# Patient Record
Sex: MALE | Race: WHITE | Employment: OTHER | URBAN - METROPOLITAN AREA
[De-identification: names, ages, dates, MRNs, and addresses within clinical notes are randomized per-mention and may not be internally consistent; named-entity substitution may affect disease eponyms.]

---

## 2019-10-26 ENCOUNTER — APPOINTMENT (OUTPATIENT)
Dept: GENERAL RADIOLOGY | Age: 49
End: 2019-10-26
Attending: NURSE PRACTITIONER
Payer: COMMERCIAL

## 2019-10-26 ENCOUNTER — HOSPITAL ENCOUNTER (EMERGENCY)
Age: 49
Discharge: HOME | End: 2019-10-26
Attending: EMERGENCY MEDICINE
Payer: COMMERCIAL

## 2019-10-26 VITALS
WEIGHT: 205.69 LBS | OXYGEN SATURATION: 98 % | BODY MASS INDEX: 27.26 KG/M2 | TEMPERATURE: 98.3 F | HEIGHT: 73 IN | DIASTOLIC BLOOD PRESSURE: 83 MMHG | HEART RATE: 62 BPM | RESPIRATION RATE: 16 BRPM | SYSTOLIC BLOOD PRESSURE: 128 MMHG
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
## 2019-10-26 DIAGNOSIS — S69.92XA FINGERNAIL INJURY, LEFT, INITIAL ENCOUNTER: Primary | ICD-10-CM

## 2019-10-26 PROCEDURE — 74011250636 HC RX REV CODE- 250/636: Performed by: NURSE PRACTITIONER

## 2019-10-26 PROCEDURE — 77030008304 HC SPLNT FNGR ALUM DERY -A

## 2019-10-26 PROCEDURE — 96372 THER/PROPH/DIAG INJ SC/IM: CPT

## 2019-10-26 PROCEDURE — 77030018836 HC SOL IRR NACL ICUM -A

## 2019-10-26 PROCEDURE — 99282 EMERGENCY DEPT VISIT SF MDM: CPT

## 2019-10-26 PROCEDURE — 90471 IMMUNIZATION ADMIN: CPT

## 2019-10-26 PROCEDURE — 75810000283 HC INJECTION NERVE BLOCK

## 2019-10-26 PROCEDURE — 73140 X-RAY EXAM OF FINGER(S): CPT

## 2019-10-26 PROCEDURE — 90715 TDAP VACCINE 7 YRS/> IM: CPT | Performed by: NURSE PRACTITIONER

## 2019-10-26 PROCEDURE — 74011000250 HC RX REV CODE- 250: Performed by: NURSE PRACTITIONER

## 2019-10-26 RX ORDER — HYDROCODONE BITARTRATE AND ACETAMINOPHEN 5; 325 MG/1; MG/1
1 TABLET ORAL
Qty: 18 TAB | Refills: 0 | Status: SHIPPED | OUTPATIENT
Start: 2019-10-26 | End: 2019-10-29

## 2019-10-26 RX ORDER — LIDOCAINE HYDROCHLORIDE 10 MG/ML
10 INJECTION, SOLUTION EPIDURAL; INFILTRATION; INTRACAUDAL; PERINEURAL ONCE
Status: COMPLETED | OUTPATIENT
Start: 2019-10-26 | End: 2019-10-26

## 2019-10-26 RX ORDER — CEPHALEXIN 500 MG/1
500 CAPSULE ORAL 4 TIMES DAILY
Qty: 28 CAP | Refills: 0 | Status: SHIPPED | OUTPATIENT
Start: 2019-10-26 | End: 2019-11-02

## 2019-10-26 RX ADMIN — LIDOCAINE HYDROCHLORIDE 10 ML: 10 INJECTION, SOLUTION EPIDURAL; INFILTRATION; INTRACAUDAL; PERINEURAL at 16:35

## 2019-10-26 RX ADMIN — TETANUS TOXOID, REDUCED DIPHTHERIA TOXOID AND ACELLULAR PERTUSSIS VACCINE, ADSORBED 0.5 ML: 5; 2.5; 8; 8; 2.5 SUSPENSION INTRAMUSCULAR at 16:23

## 2019-10-26 RX ADMIN — WATER 1000 MG: 1 INJECTION INTRAMUSCULAR; INTRAVENOUS; SUBCUTANEOUS at 16:35

## 2019-10-26 NOTE — ED PROVIDER NOTES
Patient is a 51-year-old male without significant past medical history who was ambulatory to the ED today with family members following a woodworking incident. Patient states he was working on a router when he cut the top of the left fourth finger off including the nailbed. Patient is unsure of his last tetanus. The router removed the dorsal surface of the finger nail bed area. Patient is left-hand dominant. Patient has no further complaints at this time. Primary care provider:Nohemi Mulligan MD    The history is provided by the patient. No  was used. Past Medical History:   Diagnosis Date    History of seasonal allergies      History reviewed. No pertinent surgical history. History reviewed. No pertinent family history.     Social History     Socioeconomic History    Marital status:      Spouse name: Not on file    Number of children: Not on file    Years of education: Not on file    Highest education level: Not on file   Occupational History    Not on file   Social Needs    Financial resource strain: Not on file    Food insecurity:     Worry: Not on file     Inability: Not on file    Transportation needs:     Medical: Not on file     Non-medical: Not on file   Tobacco Use    Smoking status: Never Smoker    Smokeless tobacco: Never Used   Substance and Sexual Activity    Alcohol use: Yes     Comment: social    Drug use: No    Sexual activity: Yes     Partners: Female   Lifestyle    Physical activity:     Days per week: Not on file     Minutes per session: Not on file    Stress: Not on file   Relationships    Social connections:     Talks on phone: Not on file     Gets together: Not on file     Attends [employer] service: Not on file     Active member of club or organization: Not on file     Attends meetings of clubs or organizations: Not on file     Relationship status: Not on file    Intimate partner violence:     Fear of current or ex partner: Not on file     Emotionally abused: Not on file     Physically abused: Not on file     Forced sexual activity: Not on file   Other Topics Concern    Not on file   Social History Narrative    Not on file     ALLERGIES: Penicillins    Review of Systems   Skin: Positive for wound. All other systems reviewed and are negative. Vitals:    10/26/19 1607   BP: 128/83   Pulse: 62   Resp: 16   Temp: 98.3 °F (36.8 °C)   SpO2: 98%   Weight: 93.3 kg (205 lb 11 oz)   Height: 6' 1\" (1.854 m)          Physical Exam   Constitutional: Vital signs are normal. He appears well-developed and well-nourished. He is active and cooperative. Non-toxic appearance. He does not have a sickly appearance. He does not appear ill. No distress. 51-year-old male in no apparent distress. HENT:   Head: Atraumatic. Eyes: EOM are normal.   Neck: No tracheal deviation present. Pulmonary/Chest: Effort normal. No respiratory distress. Musculoskeletal: Normal range of motion. Left hand: He exhibits tenderness, deformity, laceration and swelling. Decreased sensation noted. Hands:  Left fourth finger: Injury to the nail and nailbed. Pain with range of motion. Hemostatic wound. Unsure if nail matrix is intact on the bed nail matrix at base of nail appears to be intact however nailbed is mangled. Neurological: He is alert. Skin: Skin is warm and dry. Psychiatric: He has a normal mood and affect. His behavior is normal. Judgment and thought content normal.   Nursing note and vitals reviewed. Pre wound irrigation:     Post irrigation:        MDM     Nerve Block  Date/Time: 10/26/2019 4:45 PM  Performed by: Eric Harvey NP  Authorized by: Eric Harvey NP     Consent:     Consent obtained:  Verbal    Consent given by:  Patient  Indications:     Indications:  Pain relief and procedural anesthesia  Location:     Body area:  Upper extremity    Upper extremity nerve blocked: Finger block.     Laterality: Left  Pre-procedure details:     Skin preparation:  Alcohol  Skin anesthesia (see MAR for exact dosages):     Skin anesthesia method:  Local infiltration    Local anesthetic:  Lidocaine 1% w/o epi  Procedure details (see MAR for exact dosages): Block needle gauge:  21 G    Injection procedure:  Anatomic landmarks identified, incremental injection and negative aspiration for blood  Post-procedure details:     Outcome:  Anesthesia achieved    Patient tolerance of procedure: Tolerated well, no immediate complications  Comments:      Nerve block for wound care to finger wound. Assessment & Plan:     Orders Placed This Encounter    XR 4TH FINGER LEFT    Wound Irrigation    diph,Pertuss(AC),Tet Vac-PF (BOOSTRIX) suspension 0.5 mL    ceFAZolin (ANCEF) 1,000 mg in sterile water (preservative free) injection    lidocaine (XYLOCAINE) 10 mg/mL (1 %) injection 10 mL       Discussed with Keke Colindres MD,ED Provider    Nolan Ivy NP  10/26/19  4:12 PM      No open fracture. Wound cleansed after block of the finger. Nail matrix appears intact. Unsure of nailbed. Wound is hemostatic. Cover with Keflex 4 times a day for 7 days. Norco for pain. Follow-up with hand surgery on Monday. Keep dressing intact until then. Discussed return precautions. 5:22 PM  Patient re-evaluated. All questions answered. Patient appropriate for discharge. Given return precautions and follow up instructions. LABORATORY TESTS:  Labs Reviewed - No data to display    IMAGING RESULTS:  XR 4TH FINGER LT MIN 2 V   Final Result   IMPRESSION: No evidence of fracture or foreign bodies.           MEDICATIONS GIVEN:  Medications   diph,Pertuss(AC),Tet Vac-PF (BOOSTRIX) suspension 0.5 mL (0.5 mL IntraMUSCular Given 10/26/19 1623)   ceFAZolin (ANCEF) 1,000 mg in sterile water (preservative free) injection (1,000 mg IntraMUSCular Given 10/26/19 1635)   lidocaine (PF) (XYLOCAINE) 10 mg/mL (1 %) injection 10 mL (10 mL IntraDERMal Given by Provider 10/26/19 6313)       IMPRESSION:  1. Fingernail injury, left, initial encounter    2. Contact w powered woodworking and forming machines, init        PLAN:  1. Current Discharge Medication List      START taking these medications    Details   cephALEXin (KEFLEX) 500 mg capsule Take 1 Cap by mouth four (4) times daily for 7 days. Qty: 28 Cap, Refills: 0         CONTINUE these medications which have CHANGED    Details   HYDROcodone-acetaminophen (NORCO) 5-325 mg per tablet Take 1 Tab by mouth every four (4) hours as needed for Pain for up to 3 days. Max Daily Amount: 6 Tabs. Qty: 18 Tab, Refills: 0    Associated Diagnoses: Fingernail injury, left, initial encounter         STOP taking these medications       mometasone (NASONEX) 50 mcg/actuation nasal spray Comments:   Reason for Stoppin.   Follow-up Information     Follow up With Specialties Details Why Contact Info    [facility]  Schedule an appointment as soon as possible for a visit in 2 days Call Monday for same day appointment for one of the hand surgeons (Nancy Vo) [address] [place] [address]    Perla Gamboa MD Family Practice Schedule an appointment as soon as possible for a visit As needed [address] [facility] [id number] [phone]      [address] [facility] DEPT Emergency Medicine  As needed, If symptoms worsen [address]  [phone]        3. Return to ED for new or worsening symptoms       Agustin Beasley NP                    Please note that this dictation was completed with [employer], the computer voice recognition software. Quite often unanticipated grammatical, syntax, homophones, and other interpretive errors are inadvertently transcribed by the computer software. Please disregard these errors. Please excuse any errors that have escaped final proofreading.

## 2019-10-26 NOTE — ED NOTES
Patient  discharged with instructions per NP [facility]. Instructions reviewed with pt.

## 2019-10-26 NOTE — DISCHARGE INSTRUCTIONS
Thank you for allowing us to care for you today. Please follow-up with your Primary Care provider in the next 2-3 days if your symptoms do not improve. Plan for home:     Keep your finger in a splint and dressed. Keep the splint clean and dry. Do not remove the splint and dressing. You may follow-up with any preferred orthopedic HAND surgeon or one you have had a previous relationship with. Each has multiple locations. Chose a location that works best for you and your injury. You should use ice or heat for your injury and pain. You may use one or the other or alternate between the two. ICE ONLY FOR FIRST 50 HOURS after your injury! If it has been longer than 48 hours you may start with either. If you use ice: apply the ice pack in 20 minute intervals. 20 minutes on then 20 minutes off. Make sure to protect the skin to prevent frost bite. Never apply ice or a plastic bag with ice directly to the skin. If you use heat: Do NOT lay or sleep on a heating pad. You will burn your skin. Instead, use microwave style heat packs or Thermacare packs. These are safer than traditional heating pads. Monitor your skin to prevent burns. You have been given a prescription for a small supply of narcotic pain medication. Narcotic pain medication is highly addictive. Please keep this medication in a safe place. You should only use this medication when in extreme pain. You should not drive or operate heavy machinery for 24 hours after taking this medication. This medication also has Tylenol in it. You should not take any additional Tylenol while on this medication that exceeds 3,000mg in a 24 hour period. Come back to the ER if you have worsening symptoms, bleeding that does not stop after holding pressure,  fevers over 100.9, shaking chills, nausea or vomiting.       Patient Education        Toenail or Fingernail Avulsion: Care Instructions  Your Care Instructions  Losing a toenail or fingernail because of an injury is called avulsion. The nail may be completely or partially torn off after a trauma to the area. Your doctor may have removed the nail, put part of it back into place, or repaired the nail bed. Your toe or finger may be sore after treatment. You may have stitches. You may have some swelling, color changes, and bloody crusting on or around the wound for 2 or 3 days. This is normal. Taking good care of your wound at home will help it heal quickly and reduce your chance of infection. The wound should heal within a few weeks. If completely removed, fingernails may take 6 months to grow back. Toenails may take 12 to 18 months to grow back. Injured nails may look different when they grow back. Follow-up care is a key part of your treatment and safety. Be sure to make and go to all appointments, and call your doctor if you are having problems. It's also a good idea to know your test results and keep a list of the medicines you take. How can you care for yourself at home? · If possible, prop up the injured area on a pillow anytime you sit or lie down during the next 3 days. Try to keep it above the level of your heart. This will help reduce swelling. · Leave the bandage on, and if you have stitches, do not get them wet for the first 24 to 48 hours. Use a plastic bag to cover the area when you shower. · If your doctor told you how to care for your wound, follow your doctor's instructions. If you did not get instructions, follow this general advice:  ? After the first 24 to 48 hours, you can remove the bandage and gently wash around the wound with clean water 2 times a day. If the bandage sticks to the wound, use warm water to loosen it. Do not scrub or soak the area. ? You may cover the wound with a thin layer of petroleum jelly, such as Vaseline, and a nonstick bandage. ? Apply more petroleum jelly and replace the bandage as needed. · Do not go swimming.   · If you have stitches, do not remove them on your own. Your doctor will tell you when to return to have the stitches removed. · Be safe with medicines. Take pain medicines exactly as directed. ? If the doctor gave you a prescription medicine for pain, take it as prescribed. ? If you are not taking a prescription pain medicine, ask your doctor if you can take an over-the-counter medicine. · If your doctor prescribed antibiotics, take them as directed. Do not stop taking them just because you feel better. You need to take the full course of antibiotics. When should you call for help? Call your doctor now or seek immediate medical care if:    · The skin near the wound is cool or pale or changes color.     · The wound starts to bleed, and blood soaks through the bandage. Oozing small amounts of blood is normal.     · You have signs of infection, such as:  ? Increased pain, swelling, warmth, or redness. ? Red streaks leading from your toe or finger. ? Pus draining from your toe or finger. ? Swollen lymph nodes in your neck, armpits, or groin. ? A fever.    Watch closely for changes in your health, and be sure to contact your doctor if:    · You have problems with the nail as it grows back.     · You do not get better as expected. Where can you learn more? Go to http://yuly-gabo.info/. Enter A731 in the search box to learn more about \"Toenail or Fingernail Avulsion: Care Instructions. \"  Current as of: April 1, 2019  Content Version: 12.2  © 0160-9595 [employer]. Care instructions adapted under license by [employer] (which disclaims liability or warranty for this information). If you have questions about a medical condition or this instruction, always ask your healthcare professional. [place] [address] any warranty or liability for your use of this information.            [facility] [place]:   Appointments:   [phone]   [address]  Appointments: [phone]   [facility]: Appointments:   Phone: [phone]  Fax: [phone] On Call  Ortho On Call  [facility] On Call  Ortho On Call     [facility] On Call  Ortho On Call   [facility]   [address] [facility], [address] [facility] [address]  [address] Physician Office and Physical Therapy    Phone: [phone]  Physicians Fax: [phone]  Physical Therapy Fax: [phone] [facility]:   [address], [address],  [facility], [facility] [address] [facility])  Physician Office  Physical Therapy    [facility]  Physician Office  Physical Therapy  Office [address] [facility]  Physician Office  Physical Therapy  Hand Therapy [place]  In [address]  [address]  [place], [address] [facility]    [address] Physician Office  [address] Physical Therapy  Phone: [phone]  Physicians Fax: [phone]  Physical Therapy Fax: [phone]:  In the [facility] off [facility].   [address] [facility], [address] [facility],  [place], [address] [facility]  Physician Office  Physical Therapy    Andres Cruz  Physician Office  Physical Therapy    [place]  Physician Office  Physical Therapy   [address] [facility] Office [address]    [address] Office  [address] [facility]  [address] Physical Therapy    Phone: [phone]  Physicians Suite Fax: [phone]  Physical Therapy Suite Fax: [phone] Location:  [address] [place] [address],[address] [facility] [address]

## 2019-10-26 NOTE — ED TRIAGE NOTES
Pt presents to ED with c/o injury for  to left 4th finger tip. Injury occurred about 45 minutes PTA. Bleeding is controlled.